# Patient Record
(demographics unavailable — no encounter records)

---

## 2025-01-13 NOTE — REVIEW OF SYSTEMS
[Fever] : no fever [Chills] : no chills [Fatigue] : no fatigue [Hot Flashes] : no hot flashes [Discharge] : no discharge [Pain] : no pain [Itching] : no itching [Earache] : no earache [Hearing Loss] : no hearing loss [Nosebleed] : no nosebleeds [Postnasal Drip] : no postnasal drip [Chest Pain] : no chest pain [Palpitations] : no palpitations [Lower Ext Edema] : no lower extremity edema [Shortness Of Breath] : no shortness of breath [Cough] : no cough [Abdominal Pain] : no abdominal pain [Nausea] : no nausea [Constipation] : no constipation [Dysuria] : no dysuria [Incontinence] : no incontinence [Frequency] : no frequency [Joint Pain] : no joint pain [Itching] : no itching [Skin Rash] : no skin rash [Headache] : no headache [Dizziness] : no dizziness

## 2025-01-13 NOTE — ASSESSMENT
[FreeTextEntry1] : Renew Meds - Zyrtec 10 mg HS  Albuterol and Astelin nasal spray BID PFT pre and post - restriction/ poor test All questions answered Re check 3 months

## 2025-01-13 NOTE — HISTORY OF PRESENT ILLNESS
[FreeTextEntry1] : Here for f/up of sneeze and dry eye using Cetirizine and Azelastine w/ + response no fever chills cough wheeze CP SOB palpitations no n/v/d/ha no runny nose PND No additional complaints [de-identified] : Allergy Rx mgmt [TextEntry] : Pmhx - Asthma Psurghx- none NKDA NKFA Has had COVID 2020 Rx - Albuterol Zyrtec Astelin Famhx - Mother 45  - good health Father 53 - HTN Pre diabetes Sochx No smoke ETOH Student @ [employer] ROS - no change w/ vision or hearing Nl menses